# Patient Record
Sex: MALE | Race: BLACK OR AFRICAN AMERICAN | NOT HISPANIC OR LATINO | Employment: UNEMPLOYED | URBAN - METROPOLITAN AREA
[De-identification: names, ages, dates, MRNs, and addresses within clinical notes are randomized per-mention and may not be internally consistent; named-entity substitution may affect disease eponyms.]

---

## 2022-01-16 ENCOUNTER — HOSPITAL ENCOUNTER (EMERGENCY)
Facility: HOSPITAL | Age: 22
Discharge: HOME | End: 2022-01-16
Attending: EMERGENCY MEDICINE

## 2022-01-16 ENCOUNTER — APPOINTMENT (EMERGENCY)
Dept: RADIOLOGY | Facility: HOSPITAL | Age: 22
End: 2022-01-16

## 2022-01-16 VITALS
DIASTOLIC BLOOD PRESSURE: 93 MMHG | SYSTOLIC BLOOD PRESSURE: 142 MMHG | OXYGEN SATURATION: 100 % | TEMPERATURE: 98.7 F | BODY MASS INDEX: 20.32 KG/M2 | HEART RATE: 94 BPM | WEIGHT: 150 LBS | HEIGHT: 72 IN | RESPIRATION RATE: 17 BRPM

## 2022-01-16 DIAGNOSIS — S62.339A BOXER'S FRACTURE: Primary | ICD-10-CM
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
## 2022-01-16 PROCEDURE — 29125 APPL SHORT ARM SPLINT STATIC: CPT | Performed by: EMERGENCY MEDICINE

## 2022-01-16 PROCEDURE — 99284 EMERGENCY DEPT VISIT MOD MDM: CPT | Performed by: EMERGENCY MEDICINE

## 2022-01-16 PROCEDURE — 96372 THER/PROPH/DIAG INJ SC/IM: CPT

## 2022-01-16 PROCEDURE — 73130 X-RAY EXAM OF HAND: CPT

## 2022-01-16 PROCEDURE — 99283 EMERGENCY DEPT VISIT LOW MDM: CPT

## 2022-01-16 PROCEDURE — 73070 X-RAY EXAM OF ELBOW: CPT

## 2022-01-16 RX ORDER — ACETAMINOPHEN 325 MG/1
975 TABLET ORAL ONCE
Status: COMPLETED | OUTPATIENT
Start: 2022-01-16 | End: 2022-01-16

## 2022-01-16 RX ORDER — NAPROXEN 250 MG/1
250 TABLET ORAL 2 TIMES DAILY PRN
Qty: 20 TABLET | Refills: 0 | Status: SHIPPED | OUTPATIENT
Start: 2022-01-16 | End: 2022-01-26

## 2022-01-16 RX ORDER — MORPHINE SULFATE 30 MG/1
15 TABLET ORAL EVERY 4 HOURS PRN
Qty: 5 TABLET | Refills: 0 | Status: SHIPPED | OUTPATIENT
Start: 2022-01-16

## 2022-01-16 RX ORDER — KETOROLAC TROMETHAMINE 30 MG/ML
15 INJECTION, SOLUTION INTRAMUSCULAR; INTRAVENOUS ONCE
Status: DISCONTINUED | OUTPATIENT
Start: 2022-01-16 | End: 2022-01-16

## 2022-01-16 RX ORDER — KETOROLAC TROMETHAMINE 30 MG/ML
15 INJECTION, SOLUTION INTRAMUSCULAR; INTRAVENOUS ONCE
Status: COMPLETED | OUTPATIENT
Start: 2022-01-16 | End: 2022-01-16

## 2022-01-16 RX ADMIN — ACETAMINOPHEN 975 MG: 325 TABLET, FILM COATED ORAL at 08:05

## 2022-01-16 RX ADMIN — KETOROLAC TROMETHAMINE 15 MG: 30 INJECTION, SOLUTION INTRAMUSCULAR at 08:05

## 2022-01-16 NOTE — ED PROVIDER NOTES
History  Chief Complaint   Patient presents with    Fall     pt presents to ed for fall, fell down the stairs this morning, denies head injury -loc, -bt  right hand, left elbow injury     A 79-year-old male who presents the emergency department after a fall down approximately 5 stairs  Patient says he slipped and fell forward  Patient notes hitting his right hand straight down onto the stair  Notes pain in his right hand and left elbow  No head strike  Denies neurological deficits  Denies paresthesias  No cervical spine tenderness  Did not strike his neck  Did not strike his head  No loss of consciousness  Denies retrograde amnesia  Patient states he is alert the whole time  No confusion  No vomiting  Tetanus is up-to-date  Fall  Mechanism of injury comment:  Down stairs      None       History reviewed  No pertinent past medical history  History reviewed  No pertinent surgical history  History reviewed  No pertinent family history  I have reviewed and agree with the history as documented  E-Cigarette/Vaping     E-Cigarette/Vaping Substances     Social History     Tobacco Use    Smoking status: Never Smoker    Smokeless tobacco: Never Used   Substance Use Topics    Alcohol use: Not on file     Comment: social    Drug use: Never       Review of Systems   Musculoskeletal: Positive for arthralgias and myalgias  Neurological: Positive for numbness  All other systems reviewed and are negative  Physical Exam  Physical Exam  Vitals and nursing note reviewed  Constitutional:       General: He is not in acute distress  Appearance: He is well-developed  He is not diaphoretic  HENT:      Head: Normocephalic and atraumatic  Right Ear: External ear normal       Left Ear: External ear normal    Eyes:      Conjunctiva/sclera: Conjunctivae normal    Neck:      Vascular: No JVD  Trachea: No tracheal deviation     Cardiovascular:      Rate and Rhythm: Normal rate and regular rhythm  Heart sounds: Normal heart sounds  No murmur heard  Pulmonary:      Effort: No respiratory distress  Breath sounds: Normal breath sounds  No stridor  No wheezing or rales  Abdominal:      General: Bowel sounds are normal  There is no distension  Palpations: Abdomen is soft  There is no mass  Tenderness: There is no abdominal tenderness  There is no guarding or rebound  Musculoskeletal:         General: Tenderness and signs of injury present  No deformity  Comments: Pain on palpation of all right metacarpals  No snuffbox tenderness  Decreased sensation to light touch throughout the right hand, but more normal motor movements in the median, radial, ulnar, recurrent median branches  Patient is able to flex and extend all digits though has limited flexion of all metacarpal secondary to pain  No rotatory deformity shin with a closed fist    No C, T, L-spine tenderness  No thoracic cage tenderness  No abdominal tenderness  Skin:     General: Skin is warm and dry  Capillary Refill: Capillary refill takes less than 2 seconds  Coloration: Skin is not pale  Findings: No erythema or rash  Neurological:      General: No focal deficit present  Cranial Nerves: No cranial nerve deficit  Sensory: No sensory deficit  Motor: No weakness or abnormal muscle tone  Coordination: Coordination normal       Gait: Gait normal    Psychiatric:         Behavior: Behavior normal          Thought Content:  Thought content normal          Judgment: Judgment normal          Vital Signs  ED Triage Vitals [01/16/22 0740]   Temperature Pulse Respirations Blood Pressure SpO2   98 7 °F (37 1 °C) 94 17 142/93 100 %      Temp Source Heart Rate Source Patient Position - Orthostatic VS BP Location FiO2 (%)   Tympanic Monitor -- Left arm --      Pain Score       8           Vitals:    01/16/22 0740   BP: 142/93   Pulse: 94         Visual Acuity      ED Medications  Medications   acetaminophen (TYLENOL) tablet 975 mg (975 mg Oral Given 1/16/22 0805)   ketorolac (TORADOL) injection 15 mg (15 mg Intramuscular Given 1/16/22 0805)       Diagnostic Studies  Results Reviewed     None                 XR hand 3+ views RIGHT   ED Interpretation by Herson Silveiar DO (01/16 5356)   Boxer's fracture 5th metacarpal       XR elbow 2 views LEFT   ED Interpretation by Herson Silveira DO (01/16 1212)   No fracture dislocation  Procedures  Splint application    Date/Time: 1/16/2022 8:30 AM  Performed by: Herson Silveira DO  Authorized by: Herson Silveira DO   Universal Protocol:  Consent: The procedure was performed in an emergent situation  Verbal consent obtained  Patient identity confirmed: verbally with patient      Pre-procedure details:     Sensation:  Numbness    Skin color:  Normal cap refill  Procedure details:     Laterality:  Right    Location:  Hand    Hand:  R hand    Strapping: no  Cast type: R ulnar gutter  Splint type:  Ulnar gutter    Supplies:  Cotton padding, elastic bandage and Ortho-Glass             ED Course                                             MDM  Number of Diagnoses or Management Options  Boxer's fracture: new and requires workup  Fall  Diagnosis management comments: Patient with a fall down stairs  No neurological deficits on examination  Adamantly denies head strike, neck stiffness  Patient is able to turn his head both ways with no pain  No C, T, L-spine tenderness  Patient only states that he struck his right hand and left elbow when falling on the stairs  Patient notes slightly decreased sensation to light touch in the right hand but otherwise is neurovascularly intact  There is no obvious rotatory deformity shin of the hand when closing his fist   X-ray shows a fracture of the 5th metacarpal   Angulation is not severe  Will refer to Orthopedic surgery  Splinted by me  Good cap refill after splinting  Still able to wiggle all fingers  Sensation still intact in the hand 30 continues to note slightly decreased sensation  There is no large hematoma this time or concern for compartment syndrome  Return precautions will be given  Patient has abrasion over the left elbow  X-rays without acute findings per my read  Amount and/or Complexity of Data Reviewed  Tests in the radiology section of CPT®: ordered and reviewed  Independent visualization of images, tracings, or specimens: yes    Risk of Complications, Morbidity, and/or Mortality  Presenting problems: low  Diagnostic procedures: low  Management options: low        Disposition  Final diagnoses:   Boxer's fracture   Fall     Time reflects when diagnosis was documented in both MDM as applicable and the Disposition within this note     Time User Action Codes Description Comment    1/16/2022  8:14 AM Sergio Rader Boxer's fracture     1/16/2022  8:43 AM Robert Fox Add [D71  XXXA] Fall       ED Disposition     ED Disposition Condition Date/Time Comment    Discharge Stable Sun Jan 16, 2022  8:43 AM Chloe Robert discharge to home/self care              Follow-up Information     Follow up With Specialties Details Why Contact Info Additional Information    Edilson Mancia MD Orthopedic Surgery, Hand Surgery Schedule an appointment as soon as possible for a visit  For re-evaluation as soon as possible [address] [facility] Emergency Department Emergency Medicine  If symptoms worsen [address] [facility],[address] [id number]  [address] [place] [address] Emergency Department, [address]          Patient's Medications   Discharge Prescriptions    MORPHINE (MSIR) 30 MG TABLET    Take 0 5 tablets (15 mg total) by mouth every 4 (four) hours as needed for severe pain for up to 10 doses Max Daily Amount: 90 mg       Start Date: 1/16/2022 End Date: --       Order Dose: 15 mg       Quantity: 5 tablet    Refills: 0    NAPROXEN (NAPROSYN) 250 MG TABLET    Take 1 tablet (250 mg total) by mouth 2 (two) times a day as needed for mild pain for up to 10 days       Start Date: 1/16/2022 End Date: 1/26/2022       Order Dose: 250 mg       Quantity: 20 tablet    Refills: 0       No discharge procedures on file      PDMP Review     None          ED Provider  Electronically Signed by           Pita Tim DO  01/16/22 7135

## 2022-01-16 NOTE — DISCHARGE INSTRUCTIONS
Take naproxen twice daily for your pain  If you still have significant pain you can take the morphine sulfate as prescribed up to 10 doses  This medication is addictive  Use as little as possible of this medication  Follow-up with hand surgery soon as possible    Come back to the emergency department if you have new or worsening symptoms